# Patient Record
Sex: MALE | Race: WHITE
[De-identification: names, ages, dates, MRNs, and addresses within clinical notes are randomized per-mention and may not be internally consistent; named-entity substitution may affect disease eponyms.]

---

## 2018-05-23 ENCOUNTER — HOSPITAL ENCOUNTER (EMERGENCY)
Dept: HOSPITAL 17 - PHEFT | Age: 43
Discharge: HOME | End: 2018-05-23
Payer: OTHER GOVERNMENT

## 2018-05-23 VITALS — WEIGHT: 240.3 LBS | HEIGHT: 72 IN | BODY MASS INDEX: 32.55 KG/M2

## 2018-05-23 VITALS
DIASTOLIC BLOOD PRESSURE: 75 MMHG | HEART RATE: 80 BPM | TEMPERATURE: 98.8 F | SYSTOLIC BLOOD PRESSURE: 137 MMHG | RESPIRATION RATE: 16 BRPM | OXYGEN SATURATION: 97 %

## 2018-05-23 DIAGNOSIS — M79.672: ICD-10-CM

## 2018-05-23 DIAGNOSIS — M10.9: ICD-10-CM

## 2018-05-23 DIAGNOSIS — Z72.0: ICD-10-CM

## 2018-05-23 DIAGNOSIS — M25.572: Primary | ICD-10-CM

## 2018-05-23 PROCEDURE — 99283 EMERGENCY DEPT VISIT LOW MDM: CPT

## 2018-05-23 PROCEDURE — 73610 X-RAY EXAM OF ANKLE: CPT

## 2018-05-23 NOTE — PD
HPI


Chief Complaint:  Musculoskeletal Complaint


Time Seen by Provider:  10:21


Travel History


International Travel<30 days:  No


Contact w/Intl Traveler<30days:  No


Traveled to known affect area:  No





History of Present Illness


HPI


42-year-old male with a history of gout presents emergency department 

complaining of left ankle pain that started Saturday.  Patient says that he 

woke up with the pain and tried to perform his regular gout treatment regimen 

however, this did not help and the pain is only worsened over the last couple 

of days.  He denies any specific inciting events or injuries however says that 

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  He says there 

may be an injury he did not notice previously.  Patient says when he acute 

gouty attacks, he is able to control his pain with this regimen but again this 

is not helping.  He denies numbness or tingling of the ankle or foot.  Patient 

points to the lateral malleolus and surrounding soft tissue.  Says his pain is 

worse with extension of the ankle but is able to plantar flex and move his toes 

without any issues. His pain is mild to moderate in severity and nonradiating.  

His last gouty attack was approximately 1.5 years ago.  He does not have a 

primary care physician.  He has no other complaints today.  Of note, patient is 

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





PFSH


Past Surgical History


Oral Surgery:  Yes (WISDOM TEETH)


Other Surgery:  Yes (CYST REMOVAL)





Social History


Alcohol Use:  Yes


Tobacco Use:  Yes


Substance Use:  No





Allergies-Medications


(Allergen,Severity, Reaction):  


Uncoded Allergies:  


     anthrax vaccination (Allergy, Intermediate, DISSECTING CELLULITIS OF SCALP

, 5/23/18)


Reported Meds & Prescriptions





Reported Meds & Active Scripts


Active


Medrol Dosepak (Methylprednisolone) 4 Mg Dspk 4 Mg PO AS DIRECTED


     Per Pharmacist direction








Review of Systems


Except as stated in HPI:  all other systems reviewed are Neg





Physical Exam


Narrative


GENERAL: Well-nourished, well-developed patient, in NAD


SKIN: Focused skin assessment warm/dry. No rashes or lesions.


HEAD: Normocephalic. Atraumatic.


EYES: No scleral icterus. No injection or drainage. 


CARDIOVASCULAR: Regular rate and rhythm without murmurs, gallops, or rubs. 


RESPIRATORY: Breath sounds equal bilaterally. No accessory muscle use. No 

wheezes, rales, or rhonchi


MUSCULOSKELETAL: No cyanosis, or edema.  Homans sign and Ceron sign negative 

bilaterally


Left ankle-tenderness palpation of the lateral malleolus and surrounding soft 

tissue.  Unable to completely dorsiflex secondary to pain.  No obvious laxity.  

Neurovascular intact.


BACK: Nontender without obvious deformity. No CVA tenderness.





Data


Data


Last Documented VS





Vital Signs








  Date Time  Temp Pulse Resp B/P (MAP) Pulse Ox O2 Delivery O2 Flow Rate FiO2


 


5/23/18 10:14 98.8 80 16 137/75 (95) 97   








Orders





 Orders


Ankle, Complete ([id number]) (5/23/18 )


Ed Discharge Order (5/23/18 11:54)








MDM


Medical Decision Making


Medical Screen Exam Complete:  Yes


Emergency Medical Condition:  Yes


Differential Diagnosis


Left ankle contusion, bursitis, cellulitis, fracture, osteonecrosis, avascular 

necrosis, sprain, strain, gouty arthritis


Narrative Course


42-year-old male with a history of gout presents emergency department 

complaining of left ankle pain that started Saturday.  Patient says that he 

woke up with the pain and tried to perform his regular gout treatment regimen 

however, this did not help and the pain is only worsened over the last couple 

of days.  He denies any specific inciting events or injuries however says that 

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  He says there 

may be an injury he did not notice previously.  Patient says when he acute 

gouty attacks, he is able to control his pain with this regimen but again this 

is not helping.  He denies numbness or tingling of the ankle or foot.  Patient 

points to the lateral malleolus and surrounding soft tissue.  Says his pain is 

worse with extension of the ankle but is able to plantar flex and move his toes 

without any issues. His pain is mild to moderate in severity and nonradiating.  

His last gouty attack was approximately 1.5 years ago.  He does not have a 

primary care physician.  He has no other complaints today.  Of note, patient is 

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 


Vital signs are stable.


Physical exam findings consistent with possible ankle sprain versus stress 

fractures versus gouty arthritis


Imaging ordered to rule out acute process as patient has had repetitive trauma 

previously.


No acute process on imaging studies today.


Patient has an ankle sprain versus acute gouty arthritis.


He will be discharged with Medrol Dosepak as I suspect this may be an acute 

gouty arthritis.  It is possible that he has an ankle sprain but he does not 

describe a specific injury to the area.


He should establish with a primary care physician.  Return to the emergency 

department for worsening or persistent symptoms.





Diagnosis





 Primary Impression:  


 Ankle pain


 Qualified Codes:  M25.572 - Pain in left ankle and joints of left foot


Referrals:  


[facility]





Primary Care Physician


Departure Forms:  Tests/Procedures, Work Release   Enter return to work date:  

May 26, 2018





***Additional Instructions:  


Use ice or heat for symptom relief. If no contraindications, you may use 

Tylenol or Motrin per package instructions for your pain.


Elevate the joint above the heart to reduce swelling.


You may use compression with Ace wrap or similar to reduce swelling.


If symptoms persist or worsen, return to the emergency department.


Follow up with your primary care physician within 2 days.


Take the prednisone Dosepak as prescribed.


You may also have a sprain of the ankle which is contributing to her pain.


Scripts


Methylprednisolone Dosepak (Medrol Dosepak) 4 Mg Dspk


4 MG PO AS DIRECTED, #1 DSPK 0 Refills


   Per Pharmacist direction


   Prov: De LeonHannah FRANDY CAMACHO         5/23/18


Disposition:  01 DISCHARGE HOME


Condition:  Stable











Hafsa Rodriguez May 23, 2018 10:35

## 2018-05-23 NOTE — RADRPT
EXAM DATE:  5/23/2018 11:09 AM EDT

AGE/SEX:        42 years / Male



INDICATIONS:  Left lateral ankle pain, no known injury.



CLINICAL DATA:  This is the patient's initial encounter. Patient reports that signs and symptoms have
 been present for 4 - 6 days and indicates a pain score of 8/10. 

                                                                          

MEDICAL/SURGICAL HISTORY:       None. None.



COMPARISON:      No prior [facility] exams available for comparison.



FINDINGS:  

Bony structures are intact and in normal alignment. Is a small osteophyte on the tip of the fibula. J
oints are intact without dislocation or significant arthropathy.  Osseous density is normal.  There i
s some soft tissue swelling along the lateral malleolus..  No radiopaque foreign bodies seen.



CONCLUSION: 

Soft tissue swelling. No acute fracture or joint dislocation.



 







Electronically signed by: Landry Harmon MD  5/23/2018 11:37 AM EDT